# Patient Record
Sex: FEMALE | Race: WHITE | Employment: UNEMPLOYED | URBAN - METROPOLITAN AREA
[De-identification: names, ages, dates, MRNs, and addresses within clinical notes are randomized per-mention and may not be internally consistent; named-entity substitution may affect disease eponyms.]

---

## 2022-07-11 ENCOUNTER — HOSPITAL ENCOUNTER (EMERGENCY)
Age: 47
Discharge: HOME | End: 2022-07-11
Attending: STUDENT IN AN ORGANIZED HEALTH CARE EDUCATION/TRAINING PROGRAM
Payer: MEDICAID

## 2022-07-11 VITALS
RESPIRATION RATE: 18 BRPM | HEART RATE: 88 BPM | OXYGEN SATURATION: 97 % | SYSTOLIC BLOOD PRESSURE: 166 MMHG | TEMPERATURE: 98 F | DIASTOLIC BLOOD PRESSURE: 120 MMHG

## 2022-07-11 DIAGNOSIS — S16.1XXA STRAIN OF NECK MUSCLE, INITIAL ENCOUNTER: ICD-10-CM

## 2022-07-11 DIAGNOSIS — S09.90XA CLOSED HEAD INJURY, INITIAL ENCOUNTER: Primary | ICD-10-CM

## 2022-07-11 LAB
HCG, URINE, POC: NEGATIVE
Lab: NORMAL
NEGATIVE QC PASS/FAIL: NORMAL
POSITIVE QC PASS/FAIL: NORMAL

## 2022-07-11 PROCEDURE — 99283 EMERGENCY DEPT VISIT LOW MDM: CPT

## 2022-07-11 RX ORDER — QUETIAPINE FUMARATE 400 MG/1
400 TABLET, FILM COATED ORAL NIGHTLY
Qty: 30 TABLET | Refills: 0 | Status: ON HOLD | OUTPATIENT
Start: 2022-07-11 | End: 2022-07-18 | Stop reason: HOSPADM

## 2022-07-11 ASSESSMENT — ENCOUNTER SYMPTOMS
COUGH: 0
EYE REDNESS: 0
SHORTNESS OF BREATH: 0
EYE PAIN: 0
SINUS PAIN: 0
ABDOMINAL PAIN: 0
DIARRHEA: 0
CONSTIPATION: 0
VOMITING: 1
BACK PAIN: 0
CHEST TIGHTNESS: 0
SINUS PRESSURE: 0
NAUSEA: 0
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
## 2022-07-13 ENCOUNTER — APPOINTMENT (OUTPATIENT)
Dept: CT IMAGING | Age: 47
End: 2022-07-13
Payer: MEDICAID

## 2022-07-13 LAB
ACETAMINOPHEN LEVEL: <5 MCG/ML (ref 10–30)
ALBUMIN SERPL-MCNC: 4 G/DL (ref 3.5–5.2)
ALP BLD-CCNC: 113 U/L (ref 35–104)
ALT SERPL-CCNC: 20 U/L (ref 0–32)
AMPHETAMINE SCREEN, URINE: NOT DETECTED
ANION GAP SERPL CALCULATED.3IONS-SCNC: 17 MMOL/L (ref 7–16)
ANION GAP SERPL CALCULATED.3IONS-SCNC: 18 MMOL/L (ref 7–16)
ANISOCYTOSIS: ABNORMAL
AST SERPL-CCNC: 43 U/L (ref 0–31)
BARBITURATE SCREEN URINE: NOT DETECTED
BASOPHILIC STIPPLING: ABNORMAL
BASOPHILS ABSOLUTE: 0 E9/L (ref 0–0.2)
BASOPHILS RELATIVE PERCENT: 1.4 % (ref 0–2)
BENZODIAZEPINE SCREEN, URINE: NOT DETECTED
BILIRUB SERPL-MCNC: 1.2 MG/DL (ref 0–1.2)
BILIRUBIN URINE: NEGATIVE
BLOOD, URINE: NEGATIVE
BUN BLDV-MCNC: 3 MG/DL (ref 6–20)
BUN BLDV-MCNC: 4 MG/DL (ref 6–20)
CALCIUM SERPL-MCNC: 8.8 MG/DL (ref 8.6–10.2)
CALCIUM SERPL-MCNC: 8.9 MG/DL (ref 8.6–10.2)
CANNABINOID SCREEN URINE: NOT DETECTED
CHLORIDE BLD-SCNC: 89 MMOL/L (ref 98–107)
CHLORIDE BLD-SCNC: 92 MMOL/L (ref 98–107)
CLARITY: CLEAR
CO2: 26 MMOL/L (ref 22–29)
CO2: 26 MMOL/L (ref 22–29)
COCAINE METABOLITE SCREEN URINE: NOT DETECTED
COLOR: YELLOW
CREAT SERPL-MCNC: 0.5 MG/DL (ref 0.5–1)
CREAT SERPL-MCNC: 0.6 MG/DL (ref 0.5–1)
EKG ATRIAL RATE: 98 BPM
EKG P AXIS: 13 DEGREES
EKG P-R INTERVAL: 146 MS
EKG Q-T INTERVAL: 372 MS
EKG QRS DURATION: 76 MS
EKG QTC CALCULATION (BAZETT): 474 MS
EKG R AXIS: 90 DEGREES
EKG T AXIS: -94 DEGREES
EKG VENTRICULAR RATE: 98 BPM
EOSINOPHILS ABSOLUTE: 0 E9/L (ref 0.05–0.5)
EOSINOPHILS RELATIVE PERCENT: 1.4 % (ref 0–6)
ETHANOL: 139 MG/DL (ref 0–0.08)
FENTANYL SCREEN, URINE: NOT DETECTED
GFR AFRICAN AMERICAN: >60
GFR AFRICAN AMERICAN: >60
GFR NON-AFRICAN AMERICAN: >60 ML/MIN/1.73
GFR NON-AFRICAN AMERICAN: >60 ML/MIN/1.73
GLUCOSE BLD-MCNC: 398 MG/DL (ref 74–99)
GLUCOSE BLD-MCNC: 424 MG/DL (ref 74–99)
GLUCOSE URINE: >=1000 MG/DL
HCG, URINE, POC: NEGATIVE
HCT VFR BLD CALC: 31.5 % (ref 34–48)
HEMOGLOBIN: 11.4 G/DL (ref 11.5–15.5)
KETONES, URINE: NEGATIVE MG/DL
LEUKOCYTE ESTERASE, URINE: NEGATIVE
LYMPHOCYTES ABSOLUTE: 1.18 E9/L (ref 1.5–4)
LYMPHOCYTES RELATIVE PERCENT: 55.7 % (ref 20–42)
Lab: NORMAL
Lab: NORMAL
MCH RBC QN AUTO: 36.4 PG (ref 26–35)
MCHC RBC AUTO-ENTMCNC: 36.2 % (ref 32–34.5)
MCV RBC AUTO: 100.6 FL (ref 80–99.9)
METHADONE SCREEN, URINE: NOT DETECTED
MONOCYTES ABSOLUTE: 0.06 E9/L (ref 0.1–0.95)
MONOCYTES RELATIVE PERCENT: 2.7 % (ref 2–12)
NEGATIVE QC PASS/FAIL: NORMAL
NEUTROPHILS ABSOLUTE: 0.88 E9/L (ref 1.8–7.3)
NEUTROPHILS RELATIVE PERCENT: 41.6 % (ref 43–80)
NITRITE, URINE: NEGATIVE
OPIATE SCREEN URINE: NOT DETECTED
OVALOCYTES: ABNORMAL
OXYCODONE URINE: NOT DETECTED
PDW BLD-RTO: 13.4 FL (ref 11.5–15)
PH UA: 7 (ref 5–9)
PHENCYCLIDINE SCREEN URINE: NOT DETECTED
PLATELET # BLD: 80 E9/L (ref 130–450)
PLATELET CONFIRMATION: NORMAL
PMV BLD AUTO: 10.5 FL (ref 7–12)
POIKILOCYTES: ABNORMAL
POSITIVE QC PASS/FAIL: NORMAL
POTASSIUM SERPL-SCNC: 2.7 MMOL/L (ref 3.5–5)
POTASSIUM SERPL-SCNC: 3.3 MMOL/L (ref 3.5–5)
PROTEIN UA: NEGATIVE MG/DL
RBC # BLD: 3.13 E12/L (ref 3.5–5.5)
SALICYLATE, SERUM: <0.3 MG/DL (ref 0–30)
SARS-COV-2, NAAT: NOT DETECTED
SODIUM BLD-SCNC: 132 MMOL/L (ref 132–146)
SODIUM BLD-SCNC: 136 MMOL/L (ref 132–146)
SPECIFIC GRAVITY UA: <=1.005 (ref 1–1.03)
STOMATOCYTES: ABNORMAL
TARGET CELLS: ABNORMAL
TOTAL PROTEIN: 7.2 G/DL (ref 6.4–8.3)
TRICYCLIC ANTIDEPRESSANTS SCREEN SERUM: NEGATIVE NG/ML
TROPONIN, HIGH SENSITIVITY: 9 NG/L (ref 0–9)
TSH SERPL DL<=0.05 MIU/L-ACNC: 5.18 UIU/ML (ref 0.27–4.2)
UROBILINOGEN, URINE: 1 E.U./DL
WBC # BLD: 2.1 E9/L (ref 4.5–11.5)

## 2022-07-13 PROCEDURE — 80307 DRUG TEST PRSMV CHEM ANLYZR: CPT

## 2022-07-13 PROCEDURE — 87635 SARS-COV-2 COVID-19 AMP PRB: CPT

## 2022-07-13 PROCEDURE — 72125 CT NECK SPINE W/O DYE: CPT

## 2022-07-13 PROCEDURE — 80143 DRUG ASSAY ACETAMINOPHEN: CPT

## 2022-07-13 PROCEDURE — 6370000000 HC RX 637 (ALT 250 FOR IP): Performed by: NURSE PRACTITIONER

## 2022-07-13 PROCEDURE — 93005 ELECTROCARDIOGRAM TRACING: CPT | Performed by: PHYSICIAN ASSISTANT

## 2022-07-13 PROCEDURE — 80053 COMPREHEN METABOLIC PANEL: CPT

## 2022-07-13 PROCEDURE — 85025 COMPLETE CBC W/AUTO DIFF WBC: CPT

## 2022-07-13 PROCEDURE — 36415 COLL VENOUS BLD VENIPUNCTURE: CPT

## 2022-07-13 PROCEDURE — 99285 EMERGENCY DEPT VISIT HI MDM: CPT

## 2022-07-13 PROCEDURE — 84443 ASSAY THYROID STIM HORMONE: CPT

## 2022-07-13 PROCEDURE — 80179 DRUG ASSAY SALICYLATE: CPT

## 2022-07-13 PROCEDURE — 80048 BASIC METABOLIC PNL TOTAL CA: CPT

## 2022-07-13 PROCEDURE — 83735 ASSAY OF MAGNESIUM: CPT

## 2022-07-13 PROCEDURE — 81003 URINALYSIS AUTO W/O SCOPE: CPT

## 2022-07-13 PROCEDURE — 70450 CT HEAD/BRAIN W/O DYE: CPT

## 2022-07-13 PROCEDURE — 82077 ASSAY SPEC XCP UR&BREATH IA: CPT

## 2022-07-13 PROCEDURE — 84484 ASSAY OF TROPONIN QUANT: CPT

## 2022-07-13 RX ORDER — POTASSIUM CHLORIDE 20 MEQ/1
40 TABLET, EXTENDED RELEASE ORAL ONCE
Status: DISCONTINUED | OUTPATIENT
Start: 2022-07-13 | End: 2022-07-13

## 2022-07-13 RX ORDER — 0.9 % SODIUM CHLORIDE 0.9 %
1000 INTRAVENOUS SOLUTION INTRAVENOUS ONCE
Status: COMPLETED | OUTPATIENT
Start: 2022-07-13 | End: 2022-07-14

## 2022-07-13 RX ADMIN — POTASSIUM BICARBONATE 40 MEQ: 782 TABLET, EFFERVESCENT ORAL at 19:15

## 2022-07-13 ASSESSMENT — PAIN DESCRIPTION - DESCRIPTORS: DESCRIPTORS: ACHING

## 2022-07-13 ASSESSMENT — PAIN DESCRIPTION - PAIN TYPE: TYPE: ACUTE PAIN

## 2022-07-13 ASSESSMENT — PAIN - FUNCTIONAL ASSESSMENT: PAIN_FUNCTIONAL_ASSESSMENT: 0-10

## 2022-07-13 ASSESSMENT — PAIN SCALES - GENERAL: PAINLEVEL_OUTOF10: 5

## 2022-07-13 ASSESSMENT — PAIN DESCRIPTION - LOCATION: LOCATION: HEAD

## 2022-07-14 ENCOUNTER — HOSPITAL ENCOUNTER (OUTPATIENT)
Age: 47
Setting detail: OBSERVATION
Discharge: HOME | End: 2022-07-18
Attending: EMERGENCY MEDICINE | Admitting: INTERNAL MEDICINE
Payer: MEDICAID

## 2022-07-14 DIAGNOSIS — E87.6 HYPOKALEMIA: ICD-10-CM

## 2022-07-14 DIAGNOSIS — R73.9 HYPERGLYCEMIA: Primary | ICD-10-CM

## 2022-07-14 DIAGNOSIS — G47.00 INSOMNIA, UNSPECIFIED TYPE: ICD-10-CM

## 2022-07-14 DIAGNOSIS — R94.31 ABNORMAL EKG: ICD-10-CM

## 2022-07-14 LAB
ANION GAP SERPL CALCULATED.3IONS-SCNC: 14 MMOL/L (ref 7–16)
ANION GAP SERPL CALCULATED.3IONS-SCNC: 15 MMOL/L (ref 7–16)
BASOPHILS ABSOLUTE: 0.03 E9/L (ref 0–0.2)
BASOPHILS RELATIVE PERCENT: 1.2 % (ref 0–2)
BETA-HYDROXYBUTYRATE: 0.61 MMOL/L (ref 0.02–0.27)
BUN BLDV-MCNC: 4 MG/DL (ref 6–20)
BUN BLDV-MCNC: 4 MG/DL (ref 6–20)
CALCIUM IONIZED: 1.11 MMOL/L (ref 1.15–1.33)
CALCIUM SERPL-MCNC: 7.9 MG/DL (ref 8.6–10.2)
CALCIUM SERPL-MCNC: 7.9 MG/DL (ref 8.6–10.2)
CHLORIDE BLD-SCNC: 92 MMOL/L (ref 98–107)
CHLORIDE BLD-SCNC: 93 MMOL/L (ref 98–107)
CO2: 24 MMOL/L (ref 22–29)
CO2: 25 MMOL/L (ref 22–29)
CREAT SERPL-MCNC: 0.4 MG/DL (ref 0.5–1)
CREAT SERPL-MCNC: 0.4 MG/DL (ref 0.5–1)
EOSINOPHILS ABSOLUTE: 0.05 E9/L (ref 0.05–0.5)
EOSINOPHILS RELATIVE PERCENT: 2 % (ref 0–6)
FOLATE: 2.3 NG/ML (ref 4.8–24.2)
GFR AFRICAN AMERICAN: >60
GFR AFRICAN AMERICAN: >60
GFR NON-AFRICAN AMERICAN: >60 ML/MIN/1.73
GFR NON-AFRICAN AMERICAN: >60 ML/MIN/1.73
GLUCOSE BLD-MCNC: 277 MG/DL (ref 74–99)
GLUCOSE BLD-MCNC: 282 MG/DL (ref 74–99)
HBA1C MFR BLD: 9.9 % (ref 4–5.6)
HCT VFR BLD CALC: 28.6 % (ref 34–48)
HEMOGLOBIN: 10.3 G/DL (ref 11.5–15.5)
IMMATURE GRANULOCYTES #: 0.01 E9/L
IMMATURE GRANULOCYTES %: 0.4 % (ref 0–5)
INR BLD: 1.1
LACTIC ACID: 0.8 MMOL/L (ref 0.5–2.2)
LYMPHOCYTES ABSOLUTE: 0.85 E9/L (ref 1.5–4)
LYMPHOCYTES RELATIVE PERCENT: 34.7 % (ref 20–42)
MAGNESIUM: 1.3 MG/DL (ref 1.6–2.6)
MCH RBC QN AUTO: 35.6 PG (ref 26–35)
MCHC RBC AUTO-ENTMCNC: 36 % (ref 32–34.5)
MCV RBC AUTO: 99 FL (ref 80–99.9)
METER GLUCOSE: 252 MG/DL (ref 74–99)
METER GLUCOSE: 286 MG/DL (ref 74–99)
METER GLUCOSE: 292 MG/DL (ref 74–99)
MONOCYTES ABSOLUTE: 0.15 E9/L (ref 0.1–0.95)
MONOCYTES RELATIVE PERCENT: 6.1 % (ref 2–12)
NEUTROPHILS ABSOLUTE: 1.36 E9/L (ref 1.8–7.3)
NEUTROPHILS RELATIVE PERCENT: 55.6 % (ref 43–80)
OSMOLALITY: 283 MOSM/KG (ref 285–310)
PDW BLD-RTO: 13.4 FL (ref 11.5–15)
PLATELET # BLD: 64 E9/L (ref 130–450)
PLATELET CONFIRMATION: NORMAL
PMV BLD AUTO: 9.7 FL (ref 7–12)
POTASSIUM SERPL-SCNC: 3 MMOL/L (ref 3.5–5)
POTASSIUM SERPL-SCNC: 3.2 MMOL/L (ref 3.5–5)
PROTHROMBIN TIME: 11.7 SEC (ref 9.3–12.4)
RBC # BLD: 2.89 E12/L (ref 3.5–5.5)
SODIUM BLD-SCNC: 131 MMOL/L (ref 132–146)
SODIUM BLD-SCNC: 132 MMOL/L (ref 132–146)
T4 FREE: 0.66 NG/DL (ref 0.93–1.7)
TROPONIN, HIGH SENSITIVITY: 7 NG/L (ref 0–9)
VITAMIN B-12: 837 PG/ML (ref 211–946)
WBC # BLD: 2.5 E9/L (ref 4.5–11.5)

## 2022-07-14 PROCEDURE — 96365 THER/PROPH/DIAG IV INF INIT: CPT

## 2022-07-14 PROCEDURE — 2580000003 HC RX 258: Performed by: INTERNAL MEDICINE

## 2022-07-14 PROCEDURE — 2580000003 HC RX 258: Performed by: EMERGENCY MEDICINE

## 2022-07-14 PROCEDURE — 96372 THER/PROPH/DIAG INJ SC/IM: CPT

## 2022-07-14 PROCEDURE — 83036 HEMOGLOBIN GLYCOSYLATED A1C: CPT

## 2022-07-14 PROCEDURE — 85610 PROTHROMBIN TIME: CPT

## 2022-07-14 PROCEDURE — 6370000000 HC RX 637 (ALT 250 FOR IP): Performed by: NURSE PRACTITIONER

## 2022-07-14 PROCEDURE — 36415 COLL VENOUS BLD VENIPUNCTURE: CPT

## 2022-07-14 PROCEDURE — 96366 THER/PROPH/DIAG IV INF ADDON: CPT

## 2022-07-14 PROCEDURE — 82746 ASSAY OF FOLIC ACID SERUM: CPT

## 2022-07-14 PROCEDURE — 82330 ASSAY OF CALCIUM: CPT

## 2022-07-14 PROCEDURE — 6370000000 HC RX 637 (ALT 250 FOR IP): Performed by: INTERNAL MEDICINE

## 2022-07-14 PROCEDURE — 2500000003 HC RX 250 WO HCPCS: Performed by: STUDENT IN AN ORGANIZED HEALTH CARE EDUCATION/TRAINING PROGRAM

## 2022-07-14 PROCEDURE — 99219 PR INITIAL OBSERVATION CARE/DAY 50 MINUTES: CPT | Performed by: INTERNAL MEDICINE

## 2022-07-14 PROCEDURE — 6370000000 HC RX 637 (ALT 250 FOR IP): Performed by: STUDENT IN AN ORGANIZED HEALTH CARE EDUCATION/TRAINING PROGRAM

## 2022-07-14 PROCEDURE — 2580000003 HC RX 258

## 2022-07-14 PROCEDURE — 6360000002 HC RX W HCPCS

## 2022-07-14 PROCEDURE — 96375 TX/PRO/DX INJ NEW DRUG ADDON: CPT

## 2022-07-14 PROCEDURE — 6370000000 HC RX 637 (ALT 250 FOR IP)

## 2022-07-14 PROCEDURE — 84484 ASSAY OF TROPONIN QUANT: CPT

## 2022-07-14 PROCEDURE — 82607 VITAMIN B-12: CPT

## 2022-07-14 PROCEDURE — 84439 ASSAY OF FREE THYROXINE: CPT

## 2022-07-14 PROCEDURE — 83605 ASSAY OF LACTIC ACID: CPT

## 2022-07-14 PROCEDURE — 82962 GLUCOSE BLOOD TEST: CPT

## 2022-07-14 PROCEDURE — 85025 COMPLETE CBC W/AUTO DIFF WBC: CPT

## 2022-07-14 PROCEDURE — 83930 ASSAY OF BLOOD OSMOLALITY: CPT

## 2022-07-14 PROCEDURE — 96376 TX/PRO/DX INJ SAME DRUG ADON: CPT

## 2022-07-14 PROCEDURE — 80048 BASIC METABOLIC PNL TOTAL CA: CPT

## 2022-07-14 PROCEDURE — 82010 KETONE BODYS QUAN: CPT

## 2022-07-14 PROCEDURE — G0378 HOSPITAL OBSERVATION PER HR: HCPCS

## 2022-07-14 PROCEDURE — 2500000003 HC RX 250 WO HCPCS: Performed by: INTERNAL MEDICINE

## 2022-07-14 RX ORDER — PRAZOSIN HYDROCHLORIDE 1 MG/1
2 CAPSULE ORAL NIGHTLY
Status: DISCONTINUED | OUTPATIENT
Start: 2022-07-14 | End: 2022-07-18 | Stop reason: HOSPADM

## 2022-07-14 RX ORDER — QUETIAPINE FUMARATE 25 MG/1
25 TABLET, FILM COATED ORAL ONCE
Status: DISCONTINUED | OUTPATIENT
Start: 2022-07-14 | End: 2022-07-14

## 2022-07-14 RX ORDER — INSULIN LISPRO 100 [IU]/ML
0-3 INJECTION, SOLUTION INTRAVENOUS; SUBCUTANEOUS NIGHTLY
Status: DISCONTINUED | OUTPATIENT
Start: 2022-07-14 | End: 2022-07-17

## 2022-07-14 RX ORDER — DEXTROSE MONOHYDRATE 50 MG/ML
100 INJECTION, SOLUTION INTRAVENOUS PRN
Status: DISCONTINUED | OUTPATIENT
Start: 2022-07-14 | End: 2022-07-18 | Stop reason: HOSPADM

## 2022-07-14 RX ORDER — DOXEPIN HYDROCHLORIDE 10 MG/1
20 CAPSULE ORAL NIGHTLY
Status: ON HOLD | COMMUNITY
End: 2022-07-14

## 2022-07-14 RX ORDER — LABETALOL HYDROCHLORIDE 5 MG/ML
5 INJECTION, SOLUTION INTRAVENOUS EVERY 6 HOURS PRN
Status: DISCONTINUED | OUTPATIENT
Start: 2022-07-14 | End: 2022-07-14

## 2022-07-14 RX ORDER — ACETAMINOPHEN 650 MG/1
650 SUPPOSITORY RECTAL EVERY 6 HOURS PRN
Status: DISCONTINUED | OUTPATIENT
Start: 2022-07-14 | End: 2022-07-18 | Stop reason: HOSPADM

## 2022-07-14 RX ORDER — ONDANSETRON 2 MG/ML
4 INJECTION INTRAMUSCULAR; INTRAVENOUS EVERY 6 HOURS PRN
Status: DISCONTINUED | OUTPATIENT
Start: 2022-07-14 | End: 2022-07-18 | Stop reason: HOSPADM

## 2022-07-14 RX ORDER — METOPROLOL SUCCINATE 50 MG/1
50 TABLET, EXTENDED RELEASE ORAL DAILY
Status: DISCONTINUED | OUTPATIENT
Start: 2022-07-14 | End: 2022-07-18 | Stop reason: HOSPADM

## 2022-07-14 RX ORDER — LANOLIN ALCOHOL/MO/W.PET/CERES
1000 CREAM (GRAM) TOPICAL DAILY
COMMUNITY

## 2022-07-14 RX ORDER — HYDRALAZINE HYDROCHLORIDE 20 MG/ML
10 INJECTION INTRAMUSCULAR; INTRAVENOUS EVERY 4 HOURS PRN
Status: DISCONTINUED | OUTPATIENT
Start: 2022-07-14 | End: 2022-07-18 | Stop reason: HOSPADM

## 2022-07-14 RX ORDER — ACETAMINOPHEN 325 MG/1
650 TABLET ORAL EVERY 6 HOURS PRN
Status: DISCONTINUED | OUTPATIENT
Start: 2022-07-14 | End: 2022-07-18 | Stop reason: HOSPADM

## 2022-07-14 RX ORDER — PRAZOSIN HYDROCHLORIDE 1 MG/1
3 CAPSULE ORAL NIGHTLY
Status: DISCONTINUED | OUTPATIENT
Start: 2022-07-14 | End: 2022-07-14

## 2022-07-14 RX ORDER — POTASSIUM CHLORIDE 20 MEQ/1
40 TABLET, EXTENDED RELEASE ORAL ONCE
Status: COMPLETED | OUTPATIENT
Start: 2022-07-14 | End: 2022-07-14

## 2022-07-14 RX ORDER — POLYETHYLENE GLYCOL 3350 17 G/17G
17 POWDER, FOR SOLUTION ORAL DAILY PRN
Status: DISCONTINUED | OUTPATIENT
Start: 2022-07-14 | End: 2022-07-18 | Stop reason: HOSPADM

## 2022-07-14 RX ORDER — PANTOPRAZOLE SODIUM 40 MG/1
40 TABLET, DELAYED RELEASE ORAL
Status: DISCONTINUED | OUTPATIENT
Start: 2022-07-15 | End: 2022-07-18 | Stop reason: HOSPADM

## 2022-07-14 RX ORDER — FOLIC ACID 1 MG/1
1 TABLET ORAL DAILY
Status: DISCONTINUED | OUTPATIENT
Start: 2022-07-14 | End: 2022-07-18 | Stop reason: HOSPADM

## 2022-07-14 RX ORDER — SODIUM CHLORIDE 9 MG/ML
INJECTION, SOLUTION INTRAVENOUS PRN
Status: DISCONTINUED | OUTPATIENT
Start: 2022-07-14 | End: 2022-07-18 | Stop reason: HOSPADM

## 2022-07-14 RX ORDER — SODIUM CHLORIDE 0.9 % (FLUSH) 0.9 %
5-40 SYRINGE (ML) INJECTION EVERY 12 HOURS SCHEDULED
Status: DISCONTINUED | OUTPATIENT
Start: 2022-07-14 | End: 2022-07-18 | Stop reason: HOSPADM

## 2022-07-14 RX ORDER — LEVOTHYROXINE SODIUM 0.1 MG/1
100 TABLET ORAL DAILY
Status: DISCONTINUED | OUTPATIENT
Start: 2022-07-14 | End: 2022-07-16

## 2022-07-14 RX ORDER — DOXEPIN HYDROCHLORIDE 25 MG/1
25 CAPSULE ORAL NIGHTLY
COMMUNITY

## 2022-07-14 RX ORDER — ZOLPIDEM TARTRATE 10 MG/1
20 TABLET ORAL NIGHTLY PRN
Status: ON HOLD | COMMUNITY
End: 2022-07-18 | Stop reason: HOSPADM

## 2022-07-14 RX ORDER — ZOLPIDEM TARTRATE 5 MG/1
10 TABLET ORAL ONCE
Status: COMPLETED | OUTPATIENT
Start: 2022-07-14 | End: 2022-07-14

## 2022-07-14 RX ORDER — QUETIAPINE FUMARATE 25 MG/1
25 TABLET, FILM COATED ORAL ONCE
Status: COMPLETED | OUTPATIENT
Start: 2022-07-14 | End: 2022-07-14

## 2022-07-14 RX ORDER — SODIUM CHLORIDE 0.9 % (FLUSH) 0.9 %
5-40 SYRINGE (ML) INJECTION PRN
Status: DISCONTINUED | OUTPATIENT
Start: 2022-07-14 | End: 2022-07-18 | Stop reason: HOSPADM

## 2022-07-14 RX ORDER — HYDROXYZINE PAMOATE 50 MG/1
100 CAPSULE ORAL ONCE
Status: DISCONTINUED | OUTPATIENT
Start: 2022-07-14 | End: 2022-07-18 | Stop reason: HOSPADM

## 2022-07-14 RX ORDER — GAUZE BANDAGE 2" X 2"
100 BANDAGE TOPICAL DAILY
Status: DISCONTINUED | OUTPATIENT
Start: 2022-07-14 | End: 2022-07-18 | Stop reason: HOSPADM

## 2022-07-14 RX ORDER — INSULIN LISPRO 100 [IU]/ML
0-6 INJECTION, SOLUTION INTRAVENOUS; SUBCUTANEOUS
Status: DISCONTINUED | OUTPATIENT
Start: 2022-07-14 | End: 2022-07-17

## 2022-07-14 RX ORDER — LABETALOL HYDROCHLORIDE 5 MG/ML
10 INJECTION, SOLUTION INTRAVENOUS EVERY 4 HOURS PRN
Status: DISCONTINUED | OUTPATIENT
Start: 2022-07-14 | End: 2022-07-18 | Stop reason: HOSPADM

## 2022-07-14 RX ORDER — QUETIAPINE FUMARATE 200 MG/1
400 TABLET, FILM COATED ORAL NIGHTLY
Status: DISCONTINUED | OUTPATIENT
Start: 2022-07-14 | End: 2022-07-14

## 2022-07-14 RX ORDER — SODIUM CHLORIDE 9 MG/ML
INJECTION, SOLUTION INTRAVENOUS CONTINUOUS
Status: ACTIVE | OUTPATIENT
Start: 2022-07-14 | End: 2022-07-14

## 2022-07-14 RX ORDER — LEVOTHYROXINE SODIUM 0.1 MG/1
100 TABLET ORAL DAILY
COMMUNITY

## 2022-07-14 RX ORDER — LEVOTHYROXINE SODIUM 88 UG/1
88 TABLET ORAL DAILY
Status: DISCONTINUED | OUTPATIENT
Start: 2022-07-14 | End: 2022-07-14

## 2022-07-14 RX ORDER — PRAZOSIN HYDROCHLORIDE 1 MG/1
4 CAPSULE ORAL NIGHTLY
Status: DISCONTINUED | OUTPATIENT
Start: 2022-07-14 | End: 2022-07-14

## 2022-07-14 RX ORDER — MAGNESIUM SULFATE IN WATER 40 MG/ML
4000 INJECTION, SOLUTION INTRAVENOUS ONCE
Status: COMPLETED | OUTPATIENT
Start: 2022-07-14 | End: 2022-07-14

## 2022-07-14 RX ORDER — NICOTINE 21 MG/24HR
1 PATCH, TRANSDERMAL 24 HOURS TRANSDERMAL DAILY
Status: DISCONTINUED | OUTPATIENT
Start: 2022-07-14 | End: 2022-07-18 | Stop reason: HOSPADM

## 2022-07-14 RX ORDER — QUETIAPINE FUMARATE 100 MG/1
100 TABLET, FILM COATED ORAL NIGHTLY
COMMUNITY

## 2022-07-14 RX ORDER — QUETIAPINE FUMARATE 200 MG/1
200 TABLET, FILM COATED ORAL NIGHTLY
Status: DISCONTINUED | OUTPATIENT
Start: 2022-07-14 | End: 2022-07-15

## 2022-07-14 RX ORDER — PRAZOSIN HYDROCHLORIDE 2 MG/1
4 CAPSULE ORAL NIGHTLY
Status: ON HOLD | COMMUNITY
End: 2022-07-18 | Stop reason: HOSPADM

## 2022-07-14 RX ORDER — ENOXAPARIN SODIUM 100 MG/ML
40 INJECTION SUBCUTANEOUS DAILY
Status: DISCONTINUED | OUTPATIENT
Start: 2022-07-14 | End: 2022-07-18 | Stop reason: HOSPADM

## 2022-07-14 RX ORDER — MECOBALAMIN 5000 MCG
10 TABLET,DISINTEGRATING ORAL ONCE
Status: COMPLETED | OUTPATIENT
Start: 2022-07-14 | End: 2022-07-16

## 2022-07-14 RX ORDER — ONDANSETRON 4 MG/1
4 TABLET, ORALLY DISINTEGRATING ORAL EVERY 8 HOURS PRN
Status: DISCONTINUED | OUTPATIENT
Start: 2022-07-14 | End: 2022-07-18 | Stop reason: HOSPADM

## 2022-07-14 RX ADMIN — INSULIN LISPRO 3 UNITS: 100 INJECTION, SOLUTION INTRAVENOUS; SUBCUTANEOUS at 14:18

## 2022-07-14 RX ADMIN — POTASSIUM CHLORIDE 40 MEQ: 1500 TABLET, EXTENDED RELEASE ORAL at 16:22

## 2022-07-14 RX ADMIN — LEVOTHYROXINE SODIUM 88 MCG: 0.09 TABLET ORAL at 13:43

## 2022-07-14 RX ADMIN — POTASSIUM CHLORIDE 40 MEQ: 1500 TABLET, EXTENDED RELEASE ORAL at 22:13

## 2022-07-14 RX ADMIN — LABETALOL HYDROCHLORIDE 10 MG: 5 INJECTION, SOLUTION INTRAVENOUS at 18:53

## 2022-07-14 RX ADMIN — SODIUM CHLORIDE 1000 ML: 9 INJECTION, SOLUTION INTRAVENOUS at 02:03

## 2022-07-14 RX ADMIN — QUETIAPINE FUMARATE 200 MG: 200 TABLET ORAL at 22:13

## 2022-07-14 RX ADMIN — LABETALOL HYDROCHLORIDE 5 MG: 5 INJECTION, SOLUTION INTRAVENOUS at 13:25

## 2022-07-14 RX ADMIN — METOPROLOL SUCCINATE 50 MG: 50 TABLET, EXTENDED RELEASE ORAL at 15:49

## 2022-07-14 RX ADMIN — PRAZOSIN HYDROCHLORIDE 2 MG: 1 CAPSULE ORAL at 22:13

## 2022-07-14 RX ADMIN — LEVOTHYROXINE SODIUM 100 MCG: 0.1 TABLET ORAL at 16:22

## 2022-07-14 RX ADMIN — QUETIAPINE FUMARATE 25 MG: 25 TABLET ORAL at 09:22

## 2022-07-14 RX ADMIN — ZOLPIDEM TARTRATE 10 MG: 5 TABLET ORAL at 22:13

## 2022-07-14 RX ADMIN — SODIUM CHLORIDE: 9 INJECTION, SOLUTION INTRAVENOUS at 13:35

## 2022-07-14 RX ADMIN — POTASSIUM BICARBONATE 40 MEQ: 782 TABLET, EFFERVESCENT ORAL at 09:22

## 2022-07-14 RX ADMIN — Medication 100 MG: at 13:24

## 2022-07-14 RX ADMIN — FOLIC ACID 1 MG: 1 TABLET ORAL at 13:24

## 2022-07-14 RX ADMIN — Medication 10 ML: at 22:26

## 2022-07-14 RX ADMIN — MAGNESIUM SULFATE HEPTAHYDRATE 4000 MG: 40 INJECTION, SOLUTION INTRAVENOUS at 09:22

## 2022-07-14 RX ADMIN — ENOXAPARIN SODIUM 40 MG: 100 INJECTION SUBCUTANEOUS at 13:25

## 2022-07-14 RX ADMIN — INSULIN LISPRO 2 UNITS: 100 INJECTION, SOLUTION INTRAVENOUS; SUBCUTANEOUS at 22:13

## 2022-07-14 RX ADMIN — ONDANSETRON 4 MG: 2 INJECTION INTRAMUSCULAR; INTRAVENOUS at 21:37

## 2022-07-14 RX ADMIN — INSULIN LISPRO 3 UNITS: 100 INJECTION, SOLUTION INTRAVENOUS; SUBCUTANEOUS at 16:45

## 2022-07-14 ASSESSMENT — PAIN SCALES - GENERAL: PAINLEVEL_OUTOF10: 1

## 2022-07-14 NOTE — PROGRESS NOTES
discontinued by her PCP due to normal TSH; TSH 5.180 on presentation  8. Hx of anxiety, on seroquel 400mg nightly  9. Hx of PTSD, on prazosin  10. Hx of bipolar disorder    Plan:  · Monitor for withdrawal symptoms.  Currently not on CIWA protocol  · Monitor BP  · Follow A1c  · Monitor BG AC/HS  · Start LDSS  · Hypoglycemia protocol  · Monitor BMP daily, replace electrolytes as needed  · Follow lactic acid and BHB  · Repeat CBC this AM  · Resume levothyroxine 88mcg daily  · Decrease seroquel to 200mg nightly  · Continue prazosin  · Consider psych consult      Tenzin Bennett MD PGY-2  7/14/2022  9:03 AM

## 2022-07-14 NOTE — PLAN OF CARE
Resident did not receive any perfect serve regarding patient request. However, as soon as I saw nursing notes, I immediately called and spoke with bedside nurse regarding patient's request.      Mark Evangelista MD   PGY-2 Internal Medicine

## 2022-07-14 NOTE — ED NOTES
Attempt made to notify Dr. Tyson Flores, no answer at this time.       Arianne Marinelli RN  07/14/22 9143
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
## 2022-07-14 NOTE — ED NOTES
Report called to [facility] on [address] [id number], [id number] [facility]  07/14/22 [id number]
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
## 2022-07-14 NOTE — ED NOTES
Spoke with Dr. Rodriguez Fearing, will allow for pt to take dose of Seroquel this AM.      Nasreen Alvarenga, RN  07/14/22 [address] Mackenzie Gonzalez RN  07/14/22 1461

## 2022-07-14 NOTE — ED NOTES
Pt requesting home dose of Seroquel and Ambien at this time will notify physician       Giovana Bennett, NEMO  07/14/22 6029
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
## 2022-07-15 LAB
ANION GAP SERPL CALCULATED.3IONS-SCNC: 11 MMOL/L (ref 7–16)
BASOPHILS ABSOLUTE: 0.03 E9/L (ref 0–0.2)
BASOPHILS RELATIVE PERCENT: 1.1 % (ref 0–2)
BUN BLDV-MCNC: 3 MG/DL (ref 6–20)
CALCIUM SERPL-MCNC: 7.6 MG/DL (ref 8.6–10.2)
CHLORIDE BLD-SCNC: 98 MMOL/L (ref 98–107)
CO2: 27 MMOL/L (ref 22–29)
CREAT SERPL-MCNC: 0.5 MG/DL (ref 0.5–1)
EOSINOPHILS ABSOLUTE: 0.07 E9/L (ref 0.05–0.5)
EOSINOPHILS RELATIVE PERCENT: 2.6 % (ref 0–6)
GFR AFRICAN AMERICAN: >60
GFR NON-AFRICAN AMERICAN: >60 ML/MIN/1.73
GLUCOSE BLD-MCNC: 217 MG/DL (ref 74–99)
HCT VFR BLD CALC: 28.5 % (ref 34–48)
HEMOGLOBIN: 10.1 G/DL (ref 11.5–15.5)
IMMATURE GRANULOCYTES #: 0 E9/L
IMMATURE GRANULOCYTES %: 0 % (ref 0–5)
LYMPHOCYTES ABSOLUTE: 1.37 E9/L (ref 1.5–4)
LYMPHOCYTES RELATIVE PERCENT: 51.5 % (ref 20–42)
MCH RBC QN AUTO: 35.7 PG (ref 26–35)
MCHC RBC AUTO-ENTMCNC: 35.4 % (ref 32–34.5)
MCV RBC AUTO: 100.7 FL (ref 80–99.9)
METER GLUCOSE: 194 MG/DL (ref 74–99)
METER GLUCOSE: 210 MG/DL (ref 74–99)
METER GLUCOSE: 280 MG/DL (ref 74–99)
METER GLUCOSE: 286 MG/DL (ref 74–99)
MONOCYTES ABSOLUTE: 0.15 E9/L (ref 0.1–0.95)
MONOCYTES RELATIVE PERCENT: 5.6 % (ref 2–12)
NEUTROPHILS ABSOLUTE: 1.04 E9/L (ref 1.8–7.3)
NEUTROPHILS RELATIVE PERCENT: 39.2 % (ref 43–80)
PARATHYROID HORMONE INTACT: 74 PG/ML (ref 15–65)
PATHOLOGIST REVIEW: NORMAL
PDW BLD-RTO: 13.3 FL (ref 11.5–15)
PLATELET # BLD: 61 E9/L (ref 130–450)
PLATELET CONFIRMATION: NORMAL
PMV BLD AUTO: 10 FL (ref 7–12)
POTASSIUM SERPL-SCNC: 3.7 MMOL/L (ref 3.5–5)
RBC # BLD: 2.83 E12/L (ref 3.5–5.5)
SODIUM BLD-SCNC: 136 MMOL/L (ref 132–146)
VITAMIN D 25-HYDROXY: 57 NG/ML (ref 30–100)
WBC # BLD: 2.7 E9/L (ref 4.5–11.5)

## 2022-07-15 PROCEDURE — S5553 INSULIN LONG ACTING 5 U: HCPCS | Performed by: INTERNAL MEDICINE

## 2022-07-15 PROCEDURE — 83516 IMMUNOASSAY NONANTIBODY: CPT

## 2022-07-15 PROCEDURE — 83970 ASSAY OF PARATHORMONE: CPT

## 2022-07-15 PROCEDURE — 6370000000 HC RX 637 (ALT 250 FOR IP): Performed by: NURSE PRACTITIONER

## 2022-07-15 PROCEDURE — G0378 HOSPITAL OBSERVATION PER HR: HCPCS

## 2022-07-15 PROCEDURE — 6360000002 HC RX W HCPCS

## 2022-07-15 PROCEDURE — 6370000000 HC RX 637 (ALT 250 FOR IP)

## 2022-07-15 PROCEDURE — 85025 COMPLETE CBC W/AUTO DIFF WBC: CPT

## 2022-07-15 PROCEDURE — 36415 COLL VENOUS BLD VENIPUNCTURE: CPT

## 2022-07-15 PROCEDURE — 82306 VITAMIN D 25 HYDROXY: CPT

## 2022-07-15 PROCEDURE — 6370000000 HC RX 637 (ALT 250 FOR IP): Performed by: INTERNAL MEDICINE

## 2022-07-15 PROCEDURE — 2580000003 HC RX 258

## 2022-07-15 PROCEDURE — 80048 BASIC METABOLIC PNL TOTAL CA: CPT

## 2022-07-15 PROCEDURE — 82962 GLUCOSE BLOOD TEST: CPT

## 2022-07-15 PROCEDURE — 99225 PR SBSQ OBSERVATION CARE/DAY 25 MINUTES: CPT | Performed by: INTERNAL MEDICINE

## 2022-07-15 PROCEDURE — 96372 THER/PROPH/DIAG INJ SC/IM: CPT

## 2022-07-15 RX ORDER — QUETIAPINE FUMARATE 200 MG/1
400 TABLET, FILM COATED ORAL NIGHTLY
Status: DISCONTINUED | OUTPATIENT
Start: 2022-07-15 | End: 2022-07-18 | Stop reason: HOSPADM

## 2022-07-15 RX ORDER — ZOLPIDEM TARTRATE 5 MG/1
10 TABLET ORAL NIGHTLY PRN
Status: DISCONTINUED | OUTPATIENT
Start: 2022-07-15 | End: 2022-07-16

## 2022-07-15 RX ORDER — INSULIN GLARGINE-YFGN 100 [IU]/ML
5 INJECTION, SOLUTION SUBCUTANEOUS NIGHTLY
Status: DISCONTINUED | OUTPATIENT
Start: 2022-07-15 | End: 2022-07-18 | Stop reason: HOSPADM

## 2022-07-15 RX ORDER — TEMAZEPAM 30 MG/1
30 CAPSULE ORAL NIGHTLY PRN
COMMUNITY

## 2022-07-15 RX ORDER — QUETIAPINE FUMARATE 100 MG/1
100 TABLET, FILM COATED ORAL DAILY
Status: DISCONTINUED | OUTPATIENT
Start: 2022-07-15 | End: 2022-07-18 | Stop reason: HOSPADM

## 2022-07-15 RX ADMIN — INSULIN LISPRO 2 UNITS: 100 INJECTION, SOLUTION INTRAVENOUS; SUBCUTANEOUS at 08:36

## 2022-07-15 RX ADMIN — Medication 10 ML: at 20:58

## 2022-07-15 RX ADMIN — METOPROLOL SUCCINATE 50 MG: 50 TABLET, EXTENDED RELEASE ORAL at 10:08

## 2022-07-15 RX ADMIN — ENOXAPARIN SODIUM 40 MG: 100 INJECTION SUBCUTANEOUS at 10:08

## 2022-07-15 RX ADMIN — Medication 100 MG: at 10:08

## 2022-07-15 RX ADMIN — QUETIAPINE FUMARATE 400 MG: 200 TABLET ORAL at 20:58

## 2022-07-15 RX ADMIN — INSULIN LISPRO 3 UNITS: 100 INJECTION, SOLUTION INTRAVENOUS; SUBCUTANEOUS at 17:02

## 2022-07-15 RX ADMIN — QUETIAPINE FUMARATE 100 MG: 100 TABLET ORAL at 10:10

## 2022-07-15 RX ADMIN — INSULIN GLARGINE-YFGN 5 UNITS: 100 INJECTION, SOLUTION SUBCUTANEOUS at 20:59

## 2022-07-15 RX ADMIN — POTASSIUM BICARBONATE 40 MEQ: 782 TABLET, EFFERVESCENT ORAL at 10:08

## 2022-07-15 RX ADMIN — INSULIN LISPRO 3 UNITS: 100 INJECTION, SOLUTION INTRAVENOUS; SUBCUTANEOUS at 12:03

## 2022-07-15 RX ADMIN — ZOLPIDEM TARTRATE 10 MG: 5 TABLET ORAL at 21:08

## 2022-07-15 RX ADMIN — INSULIN LISPRO 1 UNITS: 100 INJECTION, SOLUTION INTRAVENOUS; SUBCUTANEOUS at 20:59

## 2022-07-15 RX ADMIN — PRAZOSIN HYDROCHLORIDE 2 MG: 1 CAPSULE ORAL at 20:58

## 2022-07-15 RX ADMIN — Medication 10 ML: at 10:09

## 2022-07-15 RX ADMIN — FOLIC ACID 1 MG: 1 TABLET ORAL at 10:08

## 2022-07-15 NOTE — PROGRESS NOTES
Comprehensive Nutrition Assessment    Type and Reason for Visit:  Initial, Positive Nutrition Screen    Nutrition Recommendations/Plan:   Continue current diet and start Glucerna BID to optimize intake and monitor. Malnutrition Assessment:  Malnutrition Status:  No malnutrition (07/15/22 1142)    Context:  Acute Illness     Findings of the 6 clinical characteristics of malnutrition:  Energy Intake:  Mild decrease in energy intake (Comment)  Weight Loss:  No significant weight loss     Body Fat Loss:  No significant body fat loss     Muscle Mass Loss:  No significant muscle mass loss    Fluid Accumulation:  No significant fluid accumulation     Strength:  Not Performed    Nutrition Assessment:    Pt. admit d/t behavioral abnormalities likely 2/2 alcohol intoxication vs psychosis-psych consulted. Noted hyperglycemia and elevated A1c / new onset of DM. Hx of thyroid dx. No intakes to assess at this time. Will start ONS and monitor. Nutrition Related Findings:    A&Ox4, no I/O data, GI WDL, no edema, A1c 9.9- Hyperglycemia. Pt. reports UBW of 150 and recent WL of 20# over past 3 months however CBW does not reflect. Pt. reports poor appetite d/t stress, and suboxone implant placed for the last 6 months for opioid dependence. Pt. amendable to ONS. Wound Type: None       Current Nutrition Intake & Therapies:    Average Meal Intake: Unable to assess (no intakes to assess)  Average Supplements Intake: None Ordered  ADULT DIET; Regular; 4 carb choices (60 gm/meal)  ADULT ORAL NUTRITION SUPPLEMENT; Breakfast, Lunch; Diabetic Oral Supplement    Anthropometric Measures:  Height: 5' 4\" (162.6 cm)  Ideal Body Weight (IBW): 120 lbs (55 kg)    Admission Body Weight: 140 lb (63.5 kg) (7/13 st)  Current Body Weight: 158 lb 2 oz (71.7 kg) (Per RD BS 7/15), 131.8 % IBW.  Weight Source: Stated  Current BMI (kg/m2): 27.1  Usual Body Weight:  (Pt. reports a UBW of 150# and subjective WL of 20# ; per CBW does not reflect accurate or true WL)     Weight Adjustment For: No Adjustment                 BMI Categories: Overweight (BMI 25.0-29. 9)    Estimated Daily Nutrient Needs:  Energy Requirements Based On: Formula  Weight Used for Energy Requirements: Current  Energy (kcal/day): 1260-1600kcal (20-25kcal/kg)  Weight Used for Protein Requirements: Ideal  Protein (g/day): 70-81g (1.3-1.5g/kgIBW)  Method Used for Fluid Requirements: 1 ml/kcal  Fluid (ml/day): 1200-1600ml    Nutrition Diagnosis:   Inadequate oral intake related to psychological cause or life stress (2/2 stress vs medications) as evidenced by poor intake prior to admission (pt. reports poor appetite d/t stress/ medications /suboxone implant)    Nutrition Interventions:   Food and/or Nutrient Delivery: Continue Current Diet, Start Oral Nutrition Supplement (Glucerna BID)  Nutrition Education/Counseling: No recommendation at this time  Coordination of Nutrition Care: Continue to monitor while inpatient       Goals:     Goals: PO intake 50% or greater, by next RD assessment       Nutrition Monitoring and Evaluation:   Behavioral-Environmental Outcomes: None Identified  Food/Nutrient Intake Outcomes: Food and Nutrient Intake, Supplement Intake  Physical Signs/Symptoms Outcomes: None Identified    Discharge Planning:    No discharge needs at this time     Deja Iniguez, [address]  Contact: ext [phone]
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
## 2022-07-15 NOTE — PROGRESS NOTES
Reason for consult: Newly dx    A1C:   9.9%   [] Not available         Patient states the following concerns/barriers to diabetes self-management:     [] None       [] Medication cost   [] Food cost/availability        [] Reading  [] Hearing   [] Vision                [] Work    [] Transportation  [x] No insurance-getting it validated Monday per patient. \"[place]\"  [] Physical limitations    [] Other:    Patient states the following about their diabetes/health:   [x]   No family history, has been following with her PCP and he did not test for Diabetes that she is aware. States she lost 40 # and has   Symptoms of hyperglycemia x 2 months. Drinks primarily sugar containing beverages but willing to drink more water. Patient  somewhat alert during education, but left prior to practicing insulin injection to go to bathroom. Floor nursing notified and pen device left at nurses station with RN to provide education when patient feeling better. Diabetes survival packet provided to:   [x] Patient     [] Other:    Information reviewed:   Definition of diabetes   Target glucose ranges/A1C   Self-monitoring of blood glucose   Prevention/symptoms/treatment of hypo-/hyperglycemia   Medication adherence   The plate method/meal planning guidelines   The benefits of exercise and recommendations   Reducing the risk of chronic complications      Diabetes medications reviewed (use, purpose, action): Basal and meal time insulin reviewed. Nursing will need to teach injection at bedside at next insulin dosing.             Post-education Assessment  [x]  Attentive to teaching  [x]  Answered questions appropriately when asked   [x]  Seems able to apply concepts to daily lifestyle  []  Seems motivated to do well  []  Verbalized an understanding of the plate method for portion control   []  Verbalized an understanding of prescribed antidiabetes medications   []  Verbalized an understanding of target glucose ranges/A1C level  []
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
## 2022-07-16 LAB
ANION GAP SERPL CALCULATED.3IONS-SCNC: 10 MMOL/L (ref 7–16)
BASOPHILS ABSOLUTE: 0.03 E9/L (ref 0–0.2)
BASOPHILS RELATIVE PERCENT: 1 % (ref 0–2)
BUN BLDV-MCNC: 3 MG/DL (ref 6–20)
CALCIUM SERPL-MCNC: 8.7 MG/DL (ref 8.6–10.2)
CHLORIDE BLD-SCNC: 100 MMOL/L (ref 98–107)
CHOLESTEROL, TOTAL: 187 MG/DL (ref 0–199)
CO2: 29 MMOL/L (ref 22–29)
CREAT SERPL-MCNC: 0.6 MG/DL (ref 0.5–1)
EOSINOPHILS ABSOLUTE: 0.06 E9/L (ref 0.05–0.5)
EOSINOPHILS RELATIVE PERCENT: 2 % (ref 0–6)
GFR AFRICAN AMERICAN: >60
GFR NON-AFRICAN AMERICAN: >60 ML/MIN/1.73
GLUCOSE BLD-MCNC: 187 MG/DL (ref 74–99)
HCT VFR BLD CALC: 30.5 % (ref 34–48)
HDLC SERPL-MCNC: 50 MG/DL
HEMOGLOBIN: 10.8 G/DL (ref 11.5–15.5)
IMMATURE GRANULOCYTES #: 0.02 E9/L
IMMATURE GRANULOCYTES %: 0.7 % (ref 0–5)
LDL CHOLESTEROL CALCULATED: 105 MG/DL (ref 0–99)
LYMPHOCYTES ABSOLUTE: 1.37 E9/L (ref 1.5–4)
LYMPHOCYTES RELATIVE PERCENT: 45.5 % (ref 20–42)
MCH RBC QN AUTO: 36.2 PG (ref 26–35)
MCHC RBC AUTO-ENTMCNC: 35.4 % (ref 32–34.5)
MCV RBC AUTO: 102.3 FL (ref 80–99.9)
METER GLUCOSE: 129 MG/DL (ref 74–99)
METER GLUCOSE: 205 MG/DL (ref 74–99)
METER GLUCOSE: 214 MG/DL (ref 74–99)
METER GLUCOSE: 228 MG/DL (ref 74–99)
MONOCYTES ABSOLUTE: 0.21 E9/L (ref 0.1–0.95)
MONOCYTES RELATIVE PERCENT: 7 % (ref 2–12)
NEUTROPHILS ABSOLUTE: 1.32 E9/L (ref 1.8–7.3)
NEUTROPHILS RELATIVE PERCENT: 43.8 % (ref 43–80)
PDW BLD-RTO: 13.7 FL (ref 11.5–15)
PLATELET # BLD: 71 E9/L (ref 130–450)
PLATELET CONFIRMATION: NORMAL
PMV BLD AUTO: 10.3 FL (ref 7–12)
POTASSIUM SERPL-SCNC: 3.3 MMOL/L (ref 3.5–5)
RBC # BLD: 2.98 E12/L (ref 3.5–5.5)
SODIUM BLD-SCNC: 139 MMOL/L (ref 132–146)
TRIGL SERPL-MCNC: 160 MG/DL (ref 0–149)
VLDLC SERPL CALC-MCNC: 32 MG/DL
WBC # BLD: 3 E9/L (ref 4.5–11.5)

## 2022-07-16 PROCEDURE — 6370000000 HC RX 637 (ALT 250 FOR IP): Performed by: INTERNAL MEDICINE

## 2022-07-16 PROCEDURE — 6360000002 HC RX W HCPCS

## 2022-07-16 PROCEDURE — 96376 TX/PRO/DX INJ SAME DRUG ADON: CPT

## 2022-07-16 PROCEDURE — 6370000000 HC RX 637 (ALT 250 FOR IP): Performed by: STUDENT IN AN ORGANIZED HEALTH CARE EDUCATION/TRAINING PROGRAM

## 2022-07-16 PROCEDURE — 6370000000 HC RX 637 (ALT 250 FOR IP): Performed by: NURSE PRACTITIONER

## 2022-07-16 PROCEDURE — 80048 BASIC METABOLIC PNL TOTAL CA: CPT

## 2022-07-16 PROCEDURE — 2580000003 HC RX 258

## 2022-07-16 PROCEDURE — 96372 THER/PROPH/DIAG INJ SC/IM: CPT

## 2022-07-16 PROCEDURE — S5553 INSULIN LONG ACTING 5 U: HCPCS | Performed by: INTERNAL MEDICINE

## 2022-07-16 PROCEDURE — 80061 LIPID PANEL: CPT

## 2022-07-16 PROCEDURE — 6370000000 HC RX 637 (ALT 250 FOR IP)

## 2022-07-16 PROCEDURE — 6360000002 HC RX W HCPCS: Performed by: STUDENT IN AN ORGANIZED HEALTH CARE EDUCATION/TRAINING PROGRAM

## 2022-07-16 PROCEDURE — 96375 TX/PRO/DX INJ NEW DRUG ADDON: CPT

## 2022-07-16 PROCEDURE — 2500000003 HC RX 250 WO HCPCS: Performed by: STUDENT IN AN ORGANIZED HEALTH CARE EDUCATION/TRAINING PROGRAM

## 2022-07-16 PROCEDURE — 99226 PR SBSQ OBSERVATION CARE/DAY 35 MINUTES: CPT | Performed by: INTERNAL MEDICINE

## 2022-07-16 PROCEDURE — 85025 COMPLETE CBC W/AUTO DIFF WBC: CPT

## 2022-07-16 PROCEDURE — 36415 COLL VENOUS BLD VENIPUNCTURE: CPT

## 2022-07-16 PROCEDURE — G0378 HOSPITAL OBSERVATION PER HR: HCPCS

## 2022-07-16 PROCEDURE — 82962 GLUCOSE BLOOD TEST: CPT

## 2022-07-16 RX ORDER — POTASSIUM CHLORIDE 20 MEQ/1
40 TABLET, EXTENDED RELEASE ORAL
Status: COMPLETED | OUTPATIENT
Start: 2022-07-16 | End: 2022-07-16

## 2022-07-16 RX ORDER — LORAZEPAM 1 MG/1
1 TABLET ORAL EVERY 6 HOURS PRN
Status: DISCONTINUED | OUTPATIENT
Start: 2022-07-16 | End: 2022-07-17

## 2022-07-16 RX ORDER — ZOLPIDEM TARTRATE 5 MG/1
10 TABLET ORAL NIGHTLY
Status: DISCONTINUED | OUTPATIENT
Start: 2022-07-16 | End: 2022-07-17

## 2022-07-16 RX ORDER — AMLODIPINE BESYLATE 5 MG/1
5 TABLET ORAL DAILY
Status: DISCONTINUED | OUTPATIENT
Start: 2022-07-16 | End: 2022-07-18 | Stop reason: HOSPADM

## 2022-07-16 RX ORDER — LORAZEPAM 0.5 MG/1
0.5 TABLET ORAL EVERY 6 HOURS PRN
Status: DISCONTINUED | OUTPATIENT
Start: 2022-07-16 | End: 2022-07-16

## 2022-07-16 RX ADMIN — LABETALOL HYDROCHLORIDE 10 MG: 5 INJECTION, SOLUTION INTRAVENOUS at 09:35

## 2022-07-16 RX ADMIN — Medication 10 ML: at 09:35

## 2022-07-16 RX ADMIN — POTASSIUM BICARBONATE 40 MEQ: 782 TABLET, EFFERVESCENT ORAL at 09:37

## 2022-07-16 RX ADMIN — INSULIN LISPRO 2 UNITS: 100 INJECTION, SOLUTION INTRAVENOUS; SUBCUTANEOUS at 12:10

## 2022-07-16 RX ADMIN — ACETAMINOPHEN 325MG 650 MG: 325 TABLET ORAL at 15:42

## 2022-07-16 RX ADMIN — POTASSIUM CHLORIDE 40 MEQ: 20 TABLET, EXTENDED RELEASE ORAL at 18:10

## 2022-07-16 RX ADMIN — FOLIC ACID 1 MG: 1 TABLET ORAL at 09:37

## 2022-07-16 RX ADMIN — Medication 10 MG: at 10:13

## 2022-07-16 RX ADMIN — ZOLPIDEM TARTRATE 10 MG: 5 TABLET ORAL at 21:54

## 2022-07-16 RX ADMIN — INSULIN GLARGINE-YFGN 5 UNITS: 100 INJECTION, SOLUTION SUBCUTANEOUS at 21:55

## 2022-07-16 RX ADMIN — QUETIAPINE FUMARATE 400 MG: 200 TABLET ORAL at 21:53

## 2022-07-16 RX ADMIN — INSULIN LISPRO 1 UNITS: 100 INJECTION, SOLUTION INTRAVENOUS; SUBCUTANEOUS at 21:55

## 2022-07-16 RX ADMIN — INSULIN LISPRO 2 UNITS: 100 INJECTION, SOLUTION INTRAVENOUS; SUBCUTANEOUS at 18:03

## 2022-07-16 RX ADMIN — HYDRALAZINE HYDROCHLORIDE 10 MG: 20 INJECTION INTRAMUSCULAR; INTRAVENOUS at 22:05

## 2022-07-16 RX ADMIN — QUETIAPINE FUMARATE 100 MG: 100 TABLET ORAL at 08:09

## 2022-07-16 RX ADMIN — ENOXAPARIN SODIUM 40 MG: 100 INJECTION SUBCUTANEOUS at 09:37

## 2022-07-16 RX ADMIN — Medication 100 MG: at 09:37

## 2022-07-16 RX ADMIN — HYDRALAZINE HYDROCHLORIDE 10 MG: 20 INJECTION INTRAMUSCULAR; INTRAVENOUS at 11:38

## 2022-07-16 RX ADMIN — LORAZEPAM 1 MG: 1 TABLET ORAL at 13:18

## 2022-07-16 RX ADMIN — POTASSIUM CHLORIDE 40 MEQ: 20 TABLET, EXTENDED RELEASE ORAL at 18:11

## 2022-07-16 RX ADMIN — Medication 10 ML: at 22:16

## 2022-07-16 RX ADMIN — AMLODIPINE BESYLATE 5 MG: 5 TABLET ORAL at 13:18

## 2022-07-16 RX ADMIN — LABETALOL HYDROCHLORIDE 10 MG: 5 INJECTION, SOLUTION INTRAVENOUS at 15:42

## 2022-07-16 RX ADMIN — METOPROLOL SUCCINATE 50 MG: 50 TABLET, EXTENDED RELEASE ORAL at 09:38

## 2022-07-16 RX ADMIN — LORAZEPAM 1 MG: 1 TABLET ORAL at 19:50

## 2022-07-16 RX ADMIN — PRAZOSIN HYDROCHLORIDE 2 MG: 1 CAPSULE ORAL at 21:54

## 2022-07-16 ASSESSMENT — PAIN SCALES - GENERAL: PAINLEVEL_OUTOF10: 0

## 2022-07-16 ASSESSMENT — PAIN DESCRIPTION - LOCATION: LOCATION: HEAD
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
## 2022-07-16 NOTE — PLAN OF CARE
Received [employer] regarding Ms. Samara Broderick. Concern for anxiety, restlessness, and elevated BP. Patient asking for something for anxiety. Nurse performed CIWA scoring due to concern for patient withdrawal and scored 15. Went to assess patient with Dr. Swift Or. Patient was sleeping comfortably in room. Reviewed CIWA flowchart indicating elevated scores due to anxiety, headache, agitation and visual disturbance (light sensitivity). Coordination with nurse to discuss patients anxiety at baseline likely confounding her CIWA score, however due to patient history of drinking PRN Ativan will be available for patient if needed.

## 2022-07-16 NOTE — PROGRESS NOTES
Patient tolerating PO Ativan well and keeps her calm. Patient's BP is currently under control at the end of my shift and patient is feeling hopeful. Still has a headache, is getting some relief with much needed sleep.

## 2022-07-17 LAB
ANION GAP SERPL CALCULATED.3IONS-SCNC: 10 MMOL/L (ref 7–16)
BASOPHILS ABSOLUTE: 0.04 E9/L (ref 0–0.2)
BASOPHILS RELATIVE PERCENT: 1.4 % (ref 0–2)
BUN BLDV-MCNC: 3 MG/DL (ref 6–20)
CALCIUM SERPL-MCNC: 8.6 MG/DL (ref 8.6–10.2)
CHLORIDE BLD-SCNC: 102 MMOL/L (ref 98–107)
CO2: 26 MMOL/L (ref 22–29)
CREAT SERPL-MCNC: 0.6 MG/DL (ref 0.5–1)
EOSINOPHILS ABSOLUTE: 0.07 E9/L (ref 0.05–0.5)
EOSINOPHILS RELATIVE PERCENT: 2.5 % (ref 0–6)
GFR AFRICAN AMERICAN: >60
GFR NON-AFRICAN AMERICAN: >60 ML/MIN/1.73
GLUCOSE BLD-MCNC: 147 MG/DL (ref 74–99)
HCT VFR BLD CALC: 28.7 % (ref 34–48)
HEMOGLOBIN: 10.3 G/DL (ref 11.5–15.5)
IMMATURE GRANULOCYTES #: 0.01 E9/L
IMMATURE GRANULOCYTES %: 0.4 % (ref 0–5)
LYMPHOCYTES ABSOLUTE: 1.27 E9/L (ref 1.5–4)
LYMPHOCYTES RELATIVE PERCENT: 45.7 % (ref 20–42)
MCH RBC QN AUTO: 36.3 PG (ref 26–35)
MCHC RBC AUTO-ENTMCNC: 35.9 % (ref 32–34.5)
MCV RBC AUTO: 101.1 FL (ref 80–99.9)
METER GLUCOSE: 115 MG/DL (ref 74–99)
METER GLUCOSE: 135 MG/DL (ref 74–99)
METER GLUCOSE: 142 MG/DL (ref 74–99)
METER GLUCOSE: 196 MG/DL (ref 74–99)
MONOCYTES ABSOLUTE: 0.2 E9/L (ref 0.1–0.95)
MONOCYTES RELATIVE PERCENT: 7.2 % (ref 2–12)
NEUTROPHILS ABSOLUTE: 1.19 E9/L (ref 1.8–7.3)
NEUTROPHILS RELATIVE PERCENT: 42.8 % (ref 43–80)
PDW BLD-RTO: 13.9 FL (ref 11.5–15)
PLATELET # BLD: 71 E9/L (ref 130–450)
PLATELET CONFIRMATION: NORMAL
PMV BLD AUTO: 10.8 FL (ref 7–12)
POTASSIUM SERPL-SCNC: 3.1 MMOL/L (ref 3.5–5)
RBC # BLD: 2.84 E12/L (ref 3.5–5.5)
SODIUM BLD-SCNC: 138 MMOL/L (ref 132–146)
WBC # BLD: 2.8 E9/L (ref 4.5–11.5)

## 2022-07-17 PROCEDURE — 6360000002 HC RX W HCPCS: Performed by: STUDENT IN AN ORGANIZED HEALTH CARE EDUCATION/TRAINING PROGRAM

## 2022-07-17 PROCEDURE — 6370000000 HC RX 637 (ALT 250 FOR IP): Performed by: NURSE PRACTITIONER

## 2022-07-17 PROCEDURE — 2580000003 HC RX 258

## 2022-07-17 PROCEDURE — G0378 HOSPITAL OBSERVATION PER HR: HCPCS

## 2022-07-17 PROCEDURE — 82962 GLUCOSE BLOOD TEST: CPT

## 2022-07-17 PROCEDURE — 6360000002 HC RX W HCPCS

## 2022-07-17 PROCEDURE — 6370000000 HC RX 637 (ALT 250 FOR IP)

## 2022-07-17 PROCEDURE — 6370000000 HC RX 637 (ALT 250 FOR IP): Performed by: INTERNAL MEDICINE

## 2022-07-17 PROCEDURE — 85025 COMPLETE CBC W/AUTO DIFF WBC: CPT

## 2022-07-17 PROCEDURE — 96372 THER/PROPH/DIAG INJ SC/IM: CPT

## 2022-07-17 PROCEDURE — 80048 BASIC METABOLIC PNL TOTAL CA: CPT

## 2022-07-17 PROCEDURE — S5553 INSULIN LONG ACTING 5 U: HCPCS | Performed by: INTERNAL MEDICINE

## 2022-07-17 PROCEDURE — 96376 TX/PRO/DX INJ SAME DRUG ADON: CPT

## 2022-07-17 PROCEDURE — 36415 COLL VENOUS BLD VENIPUNCTURE: CPT

## 2022-07-17 PROCEDURE — 99225 PR SBSQ OBSERVATION CARE/DAY 25 MINUTES: CPT | Performed by: INTERNAL MEDICINE

## 2022-07-17 RX ORDER — ZOLPIDEM TARTRATE 5 MG/1
10 TABLET ORAL NIGHTLY PRN
Status: DISCONTINUED | OUTPATIENT
Start: 2022-07-17 | End: 2022-07-18 | Stop reason: HOSPADM

## 2022-07-17 RX ORDER — INSULIN LISPRO 100 [IU]/ML
0-6 INJECTION, SOLUTION INTRAVENOUS; SUBCUTANEOUS NIGHTLY
Status: DISCONTINUED | OUTPATIENT
Start: 2022-07-17 | End: 2022-07-18 | Stop reason: HOSPADM

## 2022-07-17 RX ORDER — POTASSIUM CHLORIDE 20 MEQ/1
40 TABLET, EXTENDED RELEASE ORAL
Status: COMPLETED | OUTPATIENT
Start: 2022-07-17 | End: 2022-07-17

## 2022-07-17 RX ORDER — INSULIN LISPRO 100 [IU]/ML
0-12 INJECTION, SOLUTION INTRAVENOUS; SUBCUTANEOUS
Status: DISCONTINUED | OUTPATIENT
Start: 2022-07-17 | End: 2022-07-18 | Stop reason: HOSPADM

## 2022-07-17 RX ADMIN — QUETIAPINE FUMARATE 100 MG: 100 TABLET ORAL at 08:18

## 2022-07-17 RX ADMIN — Medication 10 ML: at 08:18

## 2022-07-17 RX ADMIN — PRAZOSIN HYDROCHLORIDE 2 MG: 1 CAPSULE ORAL at 20:34

## 2022-07-17 RX ADMIN — Medication 100 MG: at 08:18

## 2022-07-17 RX ADMIN — METOPROLOL SUCCINATE 50 MG: 50 TABLET, EXTENDED RELEASE ORAL at 08:19

## 2022-07-17 RX ADMIN — INSULIN LISPRO 2 UNITS: 100 INJECTION, SOLUTION INTRAVENOUS; SUBCUTANEOUS at 16:47

## 2022-07-17 RX ADMIN — AMLODIPINE BESYLATE 5 MG: 5 TABLET ORAL at 08:19

## 2022-07-17 RX ADMIN — LORAZEPAM 1 MG: 1 TABLET ORAL at 02:34

## 2022-07-17 RX ADMIN — PANTOPRAZOLE SODIUM 40 MG: 40 TABLET, DELAYED RELEASE ORAL at 06:02

## 2022-07-17 RX ADMIN — SODIUM CHLORIDE, PRESERVATIVE FREE 10 ML: 5 INJECTION INTRAVENOUS at 15:19

## 2022-07-17 RX ADMIN — POTASSIUM BICARBONATE 40 MEQ: 782 TABLET, EFFERVESCENT ORAL at 08:19

## 2022-07-17 RX ADMIN — FOLIC ACID 1 MG: 1 TABLET ORAL at 08:19

## 2022-07-17 RX ADMIN — ACETAMINOPHEN 325MG 650 MG: 325 TABLET ORAL at 20:33

## 2022-07-17 RX ADMIN — POTASSIUM CHLORIDE 40 MEQ: 20 TABLET, EXTENDED RELEASE ORAL at 10:42

## 2022-07-17 RX ADMIN — HYDRALAZINE HYDROCHLORIDE 10 MG: 20 INJECTION INTRAMUSCULAR; INTRAVENOUS at 15:19

## 2022-07-17 RX ADMIN — INSULIN GLARGINE-YFGN 5 UNITS: 100 INJECTION, SOLUTION SUBCUTANEOUS at 21:25

## 2022-07-17 RX ADMIN — LORAZEPAM 1 MG: 1 TABLET ORAL at 08:20

## 2022-07-17 RX ADMIN — POTASSIUM CHLORIDE 40 MEQ: 20 TABLET, EXTENDED RELEASE ORAL at 08:19

## 2022-07-17 RX ADMIN — Medication 10 ML: at 21:00

## 2022-07-17 RX ADMIN — QUETIAPINE FUMARATE 400 MG: 200 TABLET ORAL at 20:34

## 2022-07-17 RX ADMIN — ZOLPIDEM TARTRATE 10 MG: 5 TABLET ORAL at 21:22

## 2022-07-17 RX ADMIN — ENOXAPARIN SODIUM 40 MG: 100 INJECTION SUBCUTANEOUS at 08:18

## 2022-07-17 RX ADMIN — INSULIN LISPRO 2 UNITS: 100 INJECTION, SOLUTION INTRAVENOUS; SUBCUTANEOUS at 09:28

## 2022-07-17 ASSESSMENT — PAIN DESCRIPTION - LOCATION: LOCATION: HEAD

## 2022-07-17 ASSESSMENT — PAIN DESCRIPTION - DESCRIPTORS: DESCRIPTORS: ACHING;DISCOMFORT

## 2022-07-17 ASSESSMENT — PAIN SCALES - GENERAL: PAINLEVEL_OUTOF10: 8
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
## 2022-07-17 NOTE — PROGRESS NOTES
[facility]  Internal Medicine Residency / [address]    Attending Physician Statement  I have discussed the case, including pertinent history and exam findings with the resident and the team.  I have seen and examined the patient and the key elements of the encounter have been performed by me. I agree with the assessment, plan and orders as documented by the resident. Case Discussed During AM Rounds    Significant improvement in BP since last evening    Vital signs stable    Mental status remains stable today   No signs of alcohol withdrawal on exam- no significant tremor; vital signs stable   Resume home regimen   Re-education regarding newly diagnosed DM and education- benefit from Diabetes Education again tomorrow and possible DC    Nursing teaching for ability to show teachback for insulin administration needed    Coordination for new needs ongoing     Pancytopenia- persistent    Likely related to alcohol use   Follow-up needed as outpatient   Folic acid being replaced- low on presentation    Peripheral smear reviewed    Repeat CBC needed with alcohol abstinence   Patient has been refusing the levothyroxine previously resumed- monitor     Hyperglycemia in the presence of new onset Diabetes Mellitus based on A1c results    Insulin regimen initiated- currently stable inpatient    Sent LIZA antibody- awaiting result    ? Type II vs. WAN    Significant inpatient education ongoing     Hypertensive Urgency- improved   Continue current regimen   No significant findings of alcohol withdrawal    Continue to follow     Abnormal behavior- ?  Related to alcohol intoxication vs. Psychosis given underlying psychiatric disease    Denies SI/HI   Psychiatry assessment appreciated   Adjustment of meds per Psychiatry noted   Does not meet inpatient criteria per Psychiatry   Needs outpatient follow-up   Mental status currently remains at baseline     Hypokalemia- likely from low solute intake   Continue K replacement     Hypothyroidism- patient refusing levothyroxine    Likely HTN change not related to resuming levothyroxine    TSH in 4-6 weeks     Abnormal EKG   Continues deny any chest pain    No tele events   Further risk stratification needed   Given new onset DM- adding statin to regimen would be recommended   Possible outpatient stress testing would be recommended     PTSD/Anxiety    Psychiatry assessment appreciated   Recommended adjustment of meds but to resume and follow   NO SI/HI   Planned continued outpatient follow-up per Psychiatry     Remainder of medical problems as per resident note.       Sandrita Persaud MD, [address]   Internal Medicine Residency Faculty
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
## 2022-07-17 NOTE — PROGRESS NOTES
Patient was understanding of discontinuing Ativan because she will not have it going home. Patient called me into the room being proactive saying she was ready to try and learn. We did great practice. Feels very optimistic and ready to go. Dr. Russell Cabrera notified. Has discussion earlier about possibly discharging the patient if education was understood.

## 2022-07-18 VITALS
HEART RATE: 71 BPM | TEMPERATURE: 97.6 F | SYSTOLIC BLOOD PRESSURE: 135 MMHG | DIASTOLIC BLOOD PRESSURE: 104 MMHG | BODY MASS INDEX: 23.9 KG/M2 | WEIGHT: 140 LBS | OXYGEN SATURATION: 97 % | RESPIRATION RATE: 18 BRPM | HEIGHT: 64 IN

## 2022-07-18 LAB
ANION GAP SERPL CALCULATED.3IONS-SCNC: 14 MMOL/L (ref 7–16)
BASOPHILS ABSOLUTE: 0.04 E9/L (ref 0–0.2)
BASOPHILS RELATIVE PERCENT: 1.5 % (ref 0–2)
BUN BLDV-MCNC: 5 MG/DL (ref 6–20)
CALCIUM SERPL-MCNC: 8.8 MG/DL (ref 8.6–10.2)
CHLORIDE BLD-SCNC: 100 MMOL/L (ref 98–107)
CO2: 23 MMOL/L (ref 22–29)
CREAT SERPL-MCNC: 0.6 MG/DL (ref 0.5–1)
EOSINOPHILS ABSOLUTE: 0.05 E9/L (ref 0.05–0.5)
EOSINOPHILS RELATIVE PERCENT: 1.9 % (ref 0–6)
GFR AFRICAN AMERICAN: >60
GFR NON-AFRICAN AMERICAN: >60 ML/MIN/1.73
GLUCOSE BLD-MCNC: 118 MG/DL (ref 74–99)
HCT VFR BLD CALC: 31 % (ref 34–48)
HEMOGLOBIN: 10.8 G/DL (ref 11.5–15.5)
IMMATURE GRANULOCYTES #: 0.02 E9/L
IMMATURE GRANULOCYTES %: 0.8 % (ref 0–5)
LYMPHOCYTES ABSOLUTE: 1.25 E9/L (ref 1.5–4)
LYMPHOCYTES RELATIVE PERCENT: 48.1 % (ref 20–42)
MCH RBC QN AUTO: 36 PG (ref 26–35)
MCHC RBC AUTO-ENTMCNC: 34.8 % (ref 32–34.5)
MCV RBC AUTO: 103.3 FL (ref 80–99.9)
METER GLUCOSE: 118 MG/DL (ref 74–99)
METER GLUCOSE: 201 MG/DL (ref 74–99)
METER GLUCOSE: 202 MG/DL (ref 74–99)
MONOCYTES ABSOLUTE: 0.23 E9/L (ref 0.1–0.95)
MONOCYTES RELATIVE PERCENT: 8.8 % (ref 2–12)
NEUTROPHILS ABSOLUTE: 1.01 E9/L (ref 1.8–7.3)
NEUTROPHILS RELATIVE PERCENT: 38.9 % (ref 43–80)
PDW BLD-RTO: 14 FL (ref 11.5–15)
PLATELET # BLD: 72 E9/L (ref 130–450)
PLATELET CONFIRMATION: NORMAL
PMV BLD AUTO: 9.3 FL (ref 7–12)
POTASSIUM SERPL-SCNC: 3.8 MMOL/L (ref 3.5–5)
RBC # BLD: 3 E12/L (ref 3.5–5.5)
SODIUM BLD-SCNC: 137 MMOL/L (ref 132–146)
WBC # BLD: 2.6 E9/L (ref 4.5–11.5)

## 2022-07-18 PROCEDURE — 6370000000 HC RX 637 (ALT 250 FOR IP): Performed by: INTERNAL MEDICINE

## 2022-07-18 PROCEDURE — 36415 COLL VENOUS BLD VENIPUNCTURE: CPT

## 2022-07-18 PROCEDURE — 2580000003 HC RX 258

## 2022-07-18 PROCEDURE — 85025 COMPLETE CBC W/AUTO DIFF WBC: CPT

## 2022-07-18 PROCEDURE — 99224 PR SBSQ OBSERVATION CARE/DAY 15 MINUTES: CPT | Performed by: INTERNAL MEDICINE

## 2022-07-18 PROCEDURE — 6360000002 HC RX W HCPCS

## 2022-07-18 PROCEDURE — 6370000000 HC RX 637 (ALT 250 FOR IP)

## 2022-07-18 PROCEDURE — 6370000000 HC RX 637 (ALT 250 FOR IP): Performed by: STUDENT IN AN ORGANIZED HEALTH CARE EDUCATION/TRAINING PROGRAM

## 2022-07-18 PROCEDURE — 96372 THER/PROPH/DIAG INJ SC/IM: CPT

## 2022-07-18 PROCEDURE — G0378 HOSPITAL OBSERVATION PER HR: HCPCS

## 2022-07-18 PROCEDURE — 82962 GLUCOSE BLOOD TEST: CPT

## 2022-07-18 PROCEDURE — 80048 BASIC METABOLIC PNL TOTAL CA: CPT

## 2022-07-18 RX ORDER — BLOOD PRESSURE TEST KIT
1 KIT MISCELLANEOUS 3 TIMES DAILY
Qty: 100 EACH | Refills: 2 | Status: SHIPPED | OUTPATIENT
Start: 2022-07-18

## 2022-07-18 RX ORDER — INSULIN LISPRO 100 [IU]/ML
2 INJECTION, SOLUTION INTRAVENOUS; SUBCUTANEOUS
Qty: 2 PEN | Refills: 0 | Status: SHIPPED | OUTPATIENT
Start: 2022-07-18

## 2022-07-18 RX ORDER — PRAZOSIN HYDROCHLORIDE 2 MG/1
2 CAPSULE ORAL NIGHTLY
Qty: 30 CAPSULE | Refills: 3 | Status: SHIPPED | OUTPATIENT
Start: 2022-07-18

## 2022-07-18 RX ORDER — INSULIN GLARGINE 100 [IU]/ML
5 INJECTION, SOLUTION SUBCUTANEOUS NIGHTLY
Qty: 2 PEN | Refills: 0 | Status: SHIPPED | OUTPATIENT
Start: 2022-07-18

## 2022-07-18 RX ORDER — QUETIAPINE FUMARATE 400 MG/1
400 TABLET, FILM COATED ORAL DAILY
Qty: 30 TABLET | Refills: 0 | Status: SHIPPED | OUTPATIENT
Start: 2022-07-18 | End: 2022-08-17

## 2022-07-18 RX ORDER — QUETIAPINE FUMARATE 400 MG/1
400 TABLET, FILM COATED ORAL NIGHTLY
Qty: 30 TABLET | Refills: 0 | Status: CANCELLED | OUTPATIENT
Start: 2022-07-18

## 2022-07-18 RX ORDER — AMLODIPINE BESYLATE 5 MG/1
5 TABLET ORAL DAILY
Qty: 30 TABLET | Refills: 0 | Status: SHIPPED | OUTPATIENT
Start: 2022-07-18

## 2022-07-18 RX ORDER — METOPROLOL SUCCINATE 50 MG/1
50 TABLET, EXTENDED RELEASE ORAL DAILY
Qty: 30 TABLET | Refills: 3 | Status: SHIPPED | OUTPATIENT
Start: 2022-07-19

## 2022-07-18 RX ORDER — ZOLPIDEM TARTRATE 10 MG/1
10 TABLET ORAL NIGHTLY PRN
Qty: 10 TABLET | Refills: 0 | Status: SHIPPED | OUTPATIENT
Start: 2022-07-18 | End: 2022-07-28

## 2022-07-18 RX ORDER — LEVOTHYROXINE SODIUM 0.1 MG/1
100 TABLET ORAL DAILY
Status: DISCONTINUED | OUTPATIENT
Start: 2022-07-18 | End: 2022-07-18 | Stop reason: HOSPADM

## 2022-07-18 RX ORDER — FOLIC ACID 1 MG/1
1 TABLET ORAL DAILY
Qty: 30 TABLET | Refills: 0 | Status: SHIPPED | OUTPATIENT
Start: 2022-07-18

## 2022-07-18 RX ORDER — GLUCOSAMINE HCL/CHONDROITIN SU 500-400 MG
CAPSULE ORAL
Qty: 100 STRIP | Refills: 2 | Status: SHIPPED | OUTPATIENT
Start: 2022-07-18

## 2022-07-18 RX ORDER — ZOLPIDEM TARTRATE 10 MG/1
10 TABLET ORAL NIGHTLY PRN
Qty: 30 TABLET | Refills: 0 | Status: CANCELLED | OUTPATIENT
Start: 2022-07-18 | End: 2022-08-01

## 2022-07-18 RX ORDER — LORAZEPAM 0.5 MG/1
0.5 TABLET ORAL
Status: COMPLETED | OUTPATIENT
Start: 2022-07-18 | End: 2022-07-18

## 2022-07-18 RX ORDER — LANCETS
1 EACH MISCELLANEOUS 3 TIMES DAILY
Qty: 100 EACH | Refills: 3 | Status: SHIPPED | OUTPATIENT
Start: 2022-07-18

## 2022-07-18 RX ORDER — THIAMINE MONONITRATE (VIT B1) 100 MG
100 TABLET ORAL DAILY
Qty: 30 TABLET | Refills: 0 | Status: SHIPPED | OUTPATIENT
Start: 2022-07-18

## 2022-07-18 RX ADMIN — LORAZEPAM 0.5 MG: 0.5 TABLET ORAL at 01:55

## 2022-07-18 RX ADMIN — METOPROLOL SUCCINATE 50 MG: 50 TABLET, EXTENDED RELEASE ORAL at 09:00

## 2022-07-18 RX ADMIN — QUETIAPINE FUMARATE 100 MG: 100 TABLET ORAL at 09:00

## 2022-07-18 RX ADMIN — PANTOPRAZOLE SODIUM 40 MG: 40 TABLET, DELAYED RELEASE ORAL at 06:07

## 2022-07-18 RX ADMIN — FOLIC ACID 1 MG: 1 TABLET ORAL at 09:00

## 2022-07-18 RX ADMIN — LEVOTHYROXINE SODIUM 100 MCG: 0.1 TABLET ORAL at 12:18

## 2022-07-18 RX ADMIN — POTASSIUM BICARBONATE 40 MEQ: 782 TABLET, EFFERVESCENT ORAL at 09:00

## 2022-07-18 RX ADMIN — AMLODIPINE BESYLATE 5 MG: 5 TABLET ORAL at 09:00

## 2022-07-18 RX ADMIN — ENOXAPARIN SODIUM 40 MG: 100 INJECTION SUBCUTANEOUS at 09:03

## 2022-07-18 RX ADMIN — Medication 10 ML: at 09:00

## 2022-07-18 RX ADMIN — INSULIN LISPRO 4 UNITS: 100 INJECTION, SOLUTION INTRAVENOUS; SUBCUTANEOUS at 12:18

## 2022-07-18 RX ADMIN — Medication 100 MG: at 09:00

## 2022-07-18 ASSESSMENT — ENCOUNTER SYMPTOMS
VOMITING: 0
NAUSEA: 0
DIARRHEA: 0
SHORTNESS OF BREATH: 0
COUGH: 0
BLOOD IN STOOL: 0
CONSTIPATION: 0
ABDOMINAL PAIN: 0
BACK PAIN: 0

## 2022-07-18 NOTE — PROGRESS NOTES
CLINICAL PHARMACY NOTE: MEDS TO BEDS    Total # of Prescriptions Filled:  13   The following medications were delivered to the patient:  One touch meter  One touch test strips  One touch lancets  Metoprolol succinate 50 mg  Amlodipine besylate 5 mg  Thiamine mononitrate 757 mg  Folic acid 1 mg  Alcohol pads  Pen needles  Insulin lispro  Lantus solostart  Quetiapine fumarate 400 mg  Zolpidem tartrate 10 mg  Minipress 2 mg  sent to [facility] on [address]  Delivered to pt @ 4:24p    Additional Documentation:

## 2022-07-18 NOTE — DISCHARGE INSTRUCTIONS
[facility] Internal Medicine Resident Service      Discharge to:  Home  Diet: Regular  Activity: As tolerated   Take % units of Lantus at night  2 units of Lisrpo 3 times with meal  Check blood sugar 2 times daily (before breakfast and 2 hours after meal)  Be compliant with medications  Make an appointment and follow up with your PCP          Follow up   Call [phone] (Dr. Rachel Castaneda MD) for your next PCP follow up.       Electronically signed by Ramona Valencia MD on 7/18/2022 at 1:01 PM

## 2022-07-18 NOTE — DISCHARGE SUMMARY
disturbances. she denies any chest pain, shortness of breath, palpitations, cough, abdominal pain, fever or chills. In the ED, the patient was tachycardic, pulse 110. Labs were significant for K 2.7, anion gap 18, glucose 424, TSH 5.18. Blood ethanol levels are elevated. EKG showed T wave inversion in lead 2, 3 and aVF, but troponin was negative. Patient was given Effer-k 40 mq tablets. CT scanning of th head showed no skull fracture but showed chronic lacunar infarcts. Patient was admitted for further management. During hospital course, her potassium level remained low, though up trending from baseline, potassium replaced and resolved gradually (k-3.8). Patient was also discussed about her Hgb A1C (9.9) and elevated blood sugar (398) without history of underlying DM. After adjustment of Insulin her blood sugar was trending down. On 25 th her blood sugar came down to 118 mg/dl. Consulted diabetes education. Concern was for WAN on the back ground of her hypothyroidism was discussed. She used to take levothyroxine 88 mcg, discontinued by her PCP due to normal TSH, on presentation TSH was 5.180. Levothyroxine was resumed 100 mcg daily, though patient was refusing. She has been on Thiamine and folate supplementation (Folate: 2.3). While being in the hospital, patient denied any chest pain or dyspnea. Due to her abnormal EKG findings, risk stratification in the future will be needed. Patient became overwhelmed regarding all of her medication changes. She was reassured regarding her medications. Her blood pressure was labile, and metoprolol 50 mg has been ordered daily, resolved later. She continues her psych medications. Patient to be discharged today in stable and improved condition.  She will be discharged on Thiamine mononitrate 100 mg 1 tab, Amlodipine 5 mg 1 tab, folic acid 1 mg tab, Lantus 5units in the morning, Lispro 2 units 3 times with each meal. Adjustments to Prazosin and Metoprolol has been made and informed to patient. She continues her rest of the medications. She will follow up with her PCP after discharge. On day of discharge : I have seen and examined the patient at the bedside. Patient was a stable, alert , cooperative. She felt headache and looking for her Seroquel drug. She denies any fever, chill, SOB. Her appetite is good. No concern regarding bowel and bladder. Review of Systems   Constitutional:  Negative for chills and fever. HENT:  Negative for congestion. Respiratory:  Negative for cough and shortness of breath. Cardiovascular:  Negative for chest pain, palpitations and leg swelling. Gastrointestinal:  Negative for abdominal pain, blood in stool, constipation, diarrhea, nausea and vomiting. Musculoskeletal:  Negative for back pain and myalgias. Skin:  Negative for rash. Neurological:  Negative for dizziness and headaches. Psychiatric/Behavioral:  The patient is not nervous/anxious. Follow up in Clinic:   Rasta Valdez MD; Hospital follow up    Significant findings (history and exam, laboratory, radiological, pathology, other tests):   General Appearance: alert, appears stated age, and cooperative  HEENT:  Head: Normocephalic, no lesions, without obvious abnormality. Neck: no adenopathy, no carotid bruit, no JVD, supple, symmetrical, trachea midline, and thyroid not enlarged, symmetric, no tenderness/mass/nodules  Lung: clear to auscultation bilaterally  Heart: regular rate and rhythm, S1, S2 normal, no murmur, click, rub or gallop  Abdomen: soft, non-tender; bowel sounds normal; no masses,  no organomegaly  Extremities:  extremities normal, atraumatic, no cyanosis or edema  Musculokeletal: No joint swelling, no muscle tenderness. ROM normal in all joints of extremities.    Neurologic: Mental status: Alert, oriented, thought content appropriate        Consults:  Psychiatry        Condition at discharge: Stable    Disposition: home    Time taken for discharge : < 30 mins [] >30 mins []    Discharge Medications:  Current Discharge Medication List        START taking these medications    Details   thiamine mononitrate (THIAMINE) 100 MG tablet Take 1 tablet by mouth in the morning. Qty: 30 tablet, Refills: 0      amLODIPine (NORVASC) 5 MG tablet Take 1 tablet by mouth in the morning. Qty: 30 tablet, Refills: 0      folic acid (FOLVITE) 1 MG tablet Take 1 tablet by mouth in the morning. Qty: 30 tablet, Refills: 0      insulin glargine (LANTUS SOLOSTAR) 100 UNIT/ML injection pen Inject 5 Units into the skin nightly  Qty: 2 pen, Refills: 0      insulin lispro, 1 Unit Dial, (HUMALOG KWIKPEN) 100 UNIT/ML SOPN Inject 2 Units into the skin in the morning and 2 Units at noon and 2 Units in the evening. Inject before meals. Qty: 2 pen, Refills: 0      Accu-Chek Multiclix Lancets MISC 1 each by Does not apply route in the morning, at noon, and at bedtime  Qty: 100 each, Refills: 3      Blood Glucose Monitoring Suppl (D-CARE GLUCOMETER) w/Device KIT 1 each by Does not apply route in the morning, at noon, and at bedtime  Qty: 1 kit, Refills: 0      blood glucose monitor strips Test 3 times a day & as needed for symptoms of irregular blood glucose. Dispense sufficient amount for indicated testing frequency plus additional to accommodate PRN testing needs. Qty: 100 strip, Refills: 2    Comments: Brand per patient preference. May round up to next available package size. Insulin Pen Needle 32G X 4 MM MISC 1 each by Does not apply route daily  Qty: 100 each, Refills: 3      Alcohol Swabs PADS 1 each by Does not apply route in the morning, at noon, and at bedtime  Qty: 100 each, Refills: 2           CONTINUE these medications which have CHANGED    Details   prazosin (MINIPRESS) 2 MG capsule Take 1 capsule by mouth nightly  Qty: 30 capsule, Refills: 3      metoprolol succinate (TOPROL XL) 50 MG extended release tablet Take 1 tablet by mouth in the morning.   Qty: 30 tablet, Refills: 3           CONTINUE these medications which have NOT CHANGED    Details   temazepam (RESTORIL) 30 MG capsule Take 30 mg by mouth nightly as needed for Sleep or Anxiety. doxepin (SINEQUAN) 25 MG capsule Take 25 mg by mouth nightly      QUEtiapine (SEROQUEL) 100 MG tablet Take 100 mg by mouth nightly Takes with 400 mg tab for total of 500 mg nightly      levothyroxine (SYNTHROID) 100 MCG tablet Take 100 mcg by mouth Daily      buprenorphine er (SUBLOCADE) 300 MG/1.5ML SOSY injection Inject 300 mg into the skin every 30 days. vitamin B-12 (CYANOCOBALAMIN) 1000 MCG tablet Take 1,000 mcg by mouth daily           STOP taking these medications       zolpidem (AMBIEN) 10 MG tablet Comments:   Reason for Stopping:         traMADol (ULTRAM) 50 MG tablet Comments:   Reason for Stopping:         melatonin 5 MG TABS tablet Comments:   Reason for Stopping:               Activity: activity as tolerated  Diet: regular diet    Follow-up appointments: With her PCP to follow up regarding her mental stability    Patient Instructions:   Discharge to:  Home  Diet: Regular  Activity: As tolerated   Take 5 units of Lantus at night  2 units of Lisrpo 3 times with meal  Check blood sugar 2 times daily (before breakfast and 2 hours after meal)  Be compliant with medications  Make an appointment and follow up with your PCP          Follow up   Call [phone] (Dr. Lily Alex MD) for your next PCP follow up.       Queta Edwards MD  PGY 1   1:50 PM 7/18/2022

## 2022-07-18 NOTE — CARE COORDINATION
7/18, SW met with patient at bedside to confirm discharge plan will be back to the [facility] in ' [facility]. Spoke with Jaren Melchor from [facility] and confirmed plan will be back to the home. Jaren Melchor will be available at the home until 7pm tonight. Jaren Melchor will have transportation come get patient. Francheska's number is [phone] ext [phone]. SW to follow for further discharge planning needs.       GA Trejo Cea  [facility] Case Management  [phone]

## 2022-07-18 NOTE — PROGRESS NOTES
[facility]  Internal Medicine Residency / [address]    Attending Physician Statement  I have discussed the case, including pertinent history and exam findings with the resident and the team.  I have seen and examined the patient and the key elements of the encounter have been performed by me. I have also personally reviewed imaging studies and labs. I agree with the assessment, plan and orders as documented by the resident. No acute overnight events. Patient is doing well. BP improved but still with episodes of diastolic elevation. BS is controlled on current regimen, 1 episode of 202 at 1050am - likely diet related as FBS at 6am was controlled at 118. Denies chest pain, shortness of breath. Assessment:  New onset DM, concern for WAN. Insulin initiated in patient. DM education has met with patient as well. Alcohol use with pancytopenia. No signs of bleeding. Currently on folic acid supplementation. HTN, improved control  PTSD, anxiety. Psych medication adjustments noted - continue seroquel, parazoin decreased to 2mg at bedtime        Plan:  Okay for discharge today  Insulin and supplies prescribed  Patient would like to follow up  with her PCP   Education regarding new medications, new diagnosis of DM provided  LIZA pending  Possible outpatient stress test  Reassess TSH outpatient  Psych follow up outpatient          Remainder of medical problems as per resident note. Simon Valenzuela MD  Internal Medicine

## 2022-07-18 NOTE — PROGRESS NOTES
Discharge paperwork, meds and follow up appointments reviewed with pt. More diabetes education given as well as how to check blood glucose and give insulin via pens. Pt had meds delivered from pharmacy. IV and tele removed. Pt called for ride. Pt awaiting her ride.

## 2022-07-18 NOTE — PROGRESS NOTES
Senior Resident Addendum:  I have seen and examined the patient with the intern. I have discussed the case, including pertinent history and exam findings with the intern. I agree with the assessment, plan and orders as documented above with the following additions:    Assessment:  Acute encephalopathy, multifactorial, 2/2 alcohol intoxication versus psychosis, resolved. Hypertensive urgency, improved. Persistent pancytopenia, likely 2/2 alcohol abuse, stable. WBC 2.6, platelets 72. New onset diabetes mellitus? Type II versus WAN, on Lantus 5 units at night and low-dose sliding scale  Hypokalemia,  2/2 poor oral intake, improving  History of hypothyroidism, TSH 5.18, refusing medication  History of anxiety/PTSD      Plan:  Continue amlodipine 5 mg daily, metoprolol 50 mg daily. Discharge when Lantus 5 units at night, insulin lispro 2 units with meals. Patient is instructed to monitor blood glucose 3 times daily. Patient needs to follow-up with PCP within 1 week for hospital follow-up  Continue home medications. Patient is to follow-up with psychiatrist as outpatient. Folate and thiamine supplementation daily. Will follow LIZA results  Repeat TSH in 4 - 6 weeks per PCP  Patient is discharged in stable condition.       Michelle Walton MD, PGY-3  7/18/2022  2:46 PM

## 2022-07-19 ENCOUNTER — HOSPITAL ENCOUNTER (EMERGENCY)
Age: 47
Discharge: HOME | End: 2022-07-19
Payer: MEDICAID

## 2022-07-19 VITALS
HEART RATE: 89 BPM | SYSTOLIC BLOOD PRESSURE: 149 MMHG | DIASTOLIC BLOOD PRESSURE: 91 MMHG | RESPIRATION RATE: 17 BRPM | OXYGEN SATURATION: 99 % | TEMPERATURE: 98 F

## 2022-07-19 DIAGNOSIS — E11.9 CONTROLLED TYPE 2 DIABETES MELLITUS WITHOUT COMPLICATION, UNSPECIFIED WHETHER LONG TERM INSULIN USE (HCC): Primary | ICD-10-CM

## 2022-07-19 LAB
CHP ED QC CHECK: YES
GLUCOSE BLD-MCNC: 245 MG/DL
METER GLUCOSE: 245 MG/DL (ref 74–99)

## 2022-07-19 PROCEDURE — 99282 EMERGENCY DEPT VISIT SF MDM: CPT

## 2022-07-19 PROCEDURE — 82962 GLUCOSE BLOOD TEST: CPT

## 2022-07-19 ASSESSMENT — ENCOUNTER SYMPTOMS
SHORTNESS OF BREATH: 0
NAUSEA: 0
VOMITING: 0
ABDOMINAL PAIN: 0
CONSTIPATION: 0
DIARRHEA: 0
BLOOD IN STOOL: 0
BACK PAIN: 0
COUGH: 0
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
## 2022-07-21 LAB — GLUTAMIC ACID DECARB AB: <5 IU/ML (ref 0–5)

## 2022-10-06 RX ORDER — BLOOD SUGAR DIAGNOSTIC
STRIP MISCELLANEOUS
Qty: 100 STRIP | Refills: 1 | OUTPATIENT
Start: 2022-10-06

## 2022-11-21 RX ORDER — BLOOD SUGAR DIAGNOSTIC
STRIP MISCELLANEOUS
Qty: 100 STRIP | Refills: 1 | OUTPATIENT
Start: 2022-11-21